# Patient Record
Sex: MALE | Race: ASIAN | NOT HISPANIC OR LATINO | Employment: STUDENT | URBAN - METROPOLITAN AREA
[De-identification: names, ages, dates, MRNs, and addresses within clinical notes are randomized per-mention and may not be internally consistent; named-entity substitution may affect disease eponyms.]

---

## 2023-04-14 ENCOUNTER — APPOINTMENT (OUTPATIENT)
Dept: LAB | Facility: LAB | Age: 28
End: 2023-04-14
Payer: COMMERCIAL

## 2023-04-14 LAB
SYPHILIS TOTAL AB: NONREACTIVE
THYROTROPIN (MIU/L) IN SER/PLAS BY DETECTION LIMIT <= 0.05 MIU/L: 0.88 MIU/L (ref 0.44–3.98)

## 2023-12-01 DIAGNOSIS — N63.10 MASS OF RIGHT BREAST, UNSPECIFIED QUADRANT: Primary | ICD-10-CM

## 2023-12-04 ENCOUNTER — OFFICE VISIT (OUTPATIENT)
Dept: SURGICAL ONCOLOGY | Facility: HOSPITAL | Age: 28
End: 2023-12-04
Payer: COMMERCIAL

## 2023-12-04 ENCOUNTER — HOSPITAL ENCOUNTER (OUTPATIENT)
Dept: RADIOLOGY | Facility: HOSPITAL | Age: 28
Discharge: HOME | End: 2023-12-04
Payer: COMMERCIAL

## 2023-12-04 VITALS
WEIGHT: 136.6 LBS | DIASTOLIC BLOOD PRESSURE: 83 MMHG | SYSTOLIC BLOOD PRESSURE: 144 MMHG | TEMPERATURE: 98.4 F | HEART RATE: 90 BPM

## 2023-12-04 DIAGNOSIS — N63.10 MASS OF RIGHT BREAST, UNSPECIFIED QUADRANT: ICD-10-CM

## 2023-12-04 DIAGNOSIS — N63.10 MASS OF RIGHT BREAST, UNSPECIFIED QUADRANT: Primary | ICD-10-CM

## 2023-12-04 DIAGNOSIS — N62 GYNECOMASTIA, MALE: ICD-10-CM

## 2023-12-04 PROCEDURE — 77066 DX MAMMO INCL CAD BI: CPT

## 2023-12-04 PROCEDURE — 76642 ULTRASOUND BREAST LIMITED: CPT | Mod: RT

## 2023-12-04 PROCEDURE — 99203 OFFICE O/P NEW LOW 30 MIN: CPT | Performed by: NURSE PRACTITIONER

## 2023-12-04 PROCEDURE — 99213 OFFICE O/P EST LOW 20 MIN: CPT | Performed by: NURSE PRACTITIONER

## 2023-12-04 ASSESSMENT — PAIN SCALES - GENERAL: PAINLEVEL: 2

## 2023-12-04 NOTE — PROGRESS NOTES
Mele Rangel male   1995 28 y.o.   [id number]    Chief Complaint  New patient, right breast mass.    History Of Present Illness  Mele Rangel is a very pleasant 28 y.o. male presenting to the breast center with a self palpated right breast mass for the past month. He denies pain, fever, chills, or nipple discharge. No breast surgery or biopsy. He has a family history of breast cancer in his paternal grandmother, 50's.    BREAST IMAGIN2023 Bilateral diagnostic mammogram with right breast ultrasound, indicates BI-RADS Category 2.    FAMILY CANCER HISTORY:   Paternal Grandmother: Breast cancer, 50's     Review of Systems  Constitutional:  Negative for appetite change, fatigue, fever and unexpected weight change.   HENT:  Negative for ear pain, hearing loss, nosebleeds, sore throat and trouble swallowing.    Eyes:  Negative for discharge, itching and visual disturbance.   Breast: As stated in HPI.  Respiratory:  Negative for cough, chest tightness and shortness of breath.    Cardiovascular:  Negative for chest pain, palpitations and leg swelling.   Gastrointestinal:  Negative for abdominal pain, constipation, diarrhea and nausea.   Endocrine: Negative for cold intolerance and heat intolerance.   Genitourinary:  Negative for dysuria, frequency, hematuria, pelvic pain and vaginal bleeding.   Musculoskeletal:  Negative for arthralgias, back pain, gait problem, joint swelling and myalgias.   Skin:  Negative for color change and rash.   Allergic/Immunologic: Negative for environmental allergies and food allergies.   Neurological:  Negative for dizziness, tremors, speech difficulty, weakness, numbness and headaches.   Hematological:  Does not bruise/bleed easily.   Psychiatric/Behavioral:  Negative for agitation, dysphoric mood and sleep disturbance. The patient is not nervous/anxious.      Past Medical History  He has no past medical history on file.    Surgical History  He has no past surgical history on  file.    Family History  Cancer-related family history is not on file.     Social History  He reports that he has never smoked. He does not have any smokeless tobacco history on file. No history on file for alcohol use and drug use.    Allergies  Patient has no known allergies.    Medications  No current outpatient medications    Last Recorded Vitals  Vitals:    12/04/23 1425   BP: 144/83   Pulse: 90   Temp: 36.9 °C (98.4 °F)        Physical Exam  Chest:       Patient is alert and oriented x3 and in a relaxed and appropriate mood. Gait is steady. Sclera is clear. The breasts are nearly symmetrical. Right breast subareolar mass consistent with gynecomastia on imaging. The left breast tissue is soft without palpable abnormalities, discrete nodules or masses. The skin and nipples appear normal. There is no cervical, supraclavicular or axillary lymphadenopathy. Heart rate and rhythm normal, S1 and S2 appreciated. The lungs are clear to auscultation bilaterally.     Relevant Results and Imaging  Study Result    Narrative & Impression   Interpreted By:  Nella Akhtar  and Jeanie Scott   STUDY:  BI US BREAST LIMITED RIGHT; BI MAMMO BILATERAL DIAGNOSTIC  TOMOSYNTHESIS;  12/4/2023 2:01 pm; 12/4/2023 2:26 pm      ACCESSION NUMBER(S):  [id number]; [id number]      ORDERING CLINICIAN:  SAHIL NELSON      INDICATION:  Right breast palpable tender lump for 1 month.      COMPARISON:  None.      FINDINGS:  ULTRASOUND:      Targeted ultrasound examination with elastography of the right breast  palpable painful area of concern in the subareolar region  demonstrates hypoechoic fibroglandular tissue without suspicious  findings.      For comparison purposes, targeted ultrasound examination with  elastography of the left breast subareolar region demonstrates  similar but smaller amount of fibroglandular tissue without  suspicious findings.      Findings suggest bilateral gynecomastia, right greater the left.       MAMMOGRAPHY: 2D and tomosynthesis images were reviewed at 1 mm slice  thickness.      Density: The breast tissue is heterogeneously dense in bilateral  subareolar region, right more than left, which may obscure small  masses. Findings are consistent with bilateral gynecomastia, moderate  in the right breast and mild in the left breast, corresponding with  the patient's symptoms and correlating with the ultrasound findings.  No suspicious masses or calcifications are identified in either  breast.      IMPRESSION:  No mammographic or targeted sonographic evidence of malignancy.  Moderate right gynecomastia corresponding to the patient's symptoms;  mild left gynecomastia. Clinical follow-up and clinical management is  recommended.      BI-RADS CATEGORY:      BI-RADS Category:  2 Benign.  Recommendation:  Clinical follow-up.  Recommended Date:  N/A.  Laterality:  Bilateral.      For any future breast imaging appointments, please call [phone]).      I personally reviewed the images/study and I agree with the findings  as stated by radiology resident Sonia Ware MD. This study was  interpreted at [place], [place].      MACRO:  None      Signed by: Nella Akhtar 12/4/2023 9:38 PM     Time was spent viewing digital images. I explained the results in depth, along with suggested explanation for follow up recommendations based on the testing results. BI-RADS Category 2    Visit Diagnosis  1. Mass of right breast, unspecified quadrant        2. Gynecomastia, male          Assessment/Plan   Right breast mass, right breast gynecomastia, no breast surgery or biopsy, family history of breast cancer    Plan:  Return to PC for annual exams.     Patient Discussion/Summary  Return to PCP for annual exams.     You can see your health information, review clinical summaries from office visits & test results online when you follow your health with MY  Chart, a personal  health record. To sign up go to [email or web address]. If you need assistance with signing up or trouble getting into your account call [facility] Patient Line 24/7 at [phone].    My office phone number is [phone] if you need to get in touch with me or have additional questions or concerns. Thank you for choosing [facility] and trusting me as your healthcare provider. I look forward to seeing you again at your next office visit. I am honored to be a provider on your health care team and I remain dedicated to helping you achieve your health goals.    Ani Iniguez, FOREST-CNP

## 2023-12-13 ENCOUNTER — APPOINTMENT (OUTPATIENT)
Dept: DERMATOLOGY | Facility: CLINIC | Age: 28
End: 2023-12-13
Payer: COMMERCIAL

## 2024-01-09 ENCOUNTER — OFFICE VISIT (OUTPATIENT)
Dept: DERMATOLOGY | Facility: CLINIC | Age: 29
End: 2024-01-09
Payer: COMMERCIAL

## 2024-01-09 DIAGNOSIS — L70.0 ACNE VULGARIS: Primary | ICD-10-CM

## 2024-01-09 DIAGNOSIS — L65.9 ALOPECIA: ICD-10-CM

## 2024-01-09 DIAGNOSIS — L63.9 ALOPECIA AREATA: ICD-10-CM

## 2024-01-09 PROCEDURE — 99214 OFFICE O/P EST MOD 30 MIN: CPT | Performed by: DERMATOLOGY

## 2024-01-09 PROCEDURE — 11901 INJECT SKIN LESIONS >7: CPT | Performed by: STUDENT IN AN ORGANIZED HEALTH CARE EDUCATION/TRAINING PROGRAM

## 2024-01-09 RX ORDER — TRETINOIN 1 MG/G
CREAM TOPICAL
COMMUNITY
Start: 2023-06-20

## 2024-01-09 RX ORDER — CLINDAMYCIN PHOSPHATE 10 UG/ML
LOTION TOPICAL
COMMUNITY

## 2024-01-09 RX ORDER — ERYTHROMYCIN AND BENZOYL PEROXIDE 30; 50 MG/G; MG/G
GEL TOPICAL NIGHTLY
Qty: 93.2 G | Refills: 11 | Status: SHIPPED | OUTPATIENT
Start: 2024-01-09 | End: 2025-01-08

## 2024-01-09 RX ORDER — CLOBETASOL PROPIONATE 0.5 MG/G
OINTMENT TOPICAL
COMMUNITY
Start: 2023-04-12

## 2024-01-09 RX ORDER — KETOCONAZOLE 20 MG/ML
SHAMPOO, SUSPENSION TOPICAL
COMMUNITY
Start: 2022-08-30 | End: 2024-04-10 | Stop reason: ALTCHOICE

## 2024-01-09 ASSESSMENT — BODY SURFACE AREA (BSA): WHAT IS THE BSA PERCENTAGE: >10% BODY SURFACE AREA INVOLVED

## 2024-01-09 ASSESSMENT — PHYSICIAN GLOBAL ASSESSMENT (PGA): WHAT IS THE PGA: PHYSICIAN GLOBAL ASSESSMENT:  3 - MODERATE

## 2024-01-09 NOTE — Clinical Note
January 9, 2024       No Recipients    Patient: Mele Rangel   YOB: 1995   Date of Visit: 1/9/2024       Dear Dr. Catalan Recipients:    Thank you for referring Mele Rangel to me for evaluation. Below are my notes for this consultation.  If you have questions, please do not hesitate to call me. I look forward to following your patient along with you.       Sincerely,     Minal Richmond MD      CC:   No Recipients  ______________________________________________________________________________________    Subjective    Mele Rangel is a 28 y.o. male who presents for the following: Acne (Follow up. Reports main concern with acne on back. Has been using his topicals as prescribed, regularly.  He reports acne on the back has stayed the same.). Patient is using tretinoin 0.1% cream nightly. Patient is using 10% Benzoyl Peroxide wash in the shower nightly, as well as 1% Clindamycin lotion on his trunk daily. Patient states that he does not feel that these have led to significant improvement. Patient has previously had 4 courses of isotretinoin, most recently in 2016. Patient is unsure of dose, but was on isotretinoin each time for 6 months. Patient may be moving at this time. If stay outside records. Topical dapsone would only need to use for just chest ti avoid systemic absorption and risk of methohemglobinemia  or BP/erythromycin leave on to leave on before bed instead of BP clindamycin, chest neck and back. Leave on at night, wear an old shirt. Ok with my chart message. Culture of a pustule. Will consider changing clindamycin using a different antibiotic if gram negative bacteria hyperpigmentation, scarring, comedones on face. Tretinoin at night only on face, the other thing at night overnight. Alopecia areata frontal scalp area of alopecia thinning of hair    Review of Systems:  No other skin or systemic complaints other than what is documented elsewhere in the note.    The following portions of the chart  were reviewed this encounter and updated as appropriate:       Specialty Problems    None    Past Medical History:  Mele Rangel  has no past medical history on file.    Past Surgical History:  Mele Rangel  has no past surgical history on file.    Family History:  Patient family history is not on file.    Social History:  Mele Rangel  reports that he has never smoked. He does not have any smokeless tobacco history on file. No history on file for alcohol use and drug use.    Allergies:  Patient has no known allergies.    Current Medications / CAM's:    Current Outpatient Medications:     clobetasol (Temovate) 0.05 % ointment, 1 Application, Disp: , Rfl:     ketoconazole (NIZOral) 2 % shampoo, 1 Application, Disp: , Rfl:     tretinoin (Retin-A) 0.1 % cream, 1 Application, Disp: , Rfl:     clindamycin (Cleocin T) 1 % lotion, apply topically to affected area twice a day ON back NECK and face, Disp: , Rfl:      Objective  Well appearing patient in no apparent distress; mood and affect are within normal limits.    A full examination *** was performed including scalp, head, eyes, ears, nose, lips, neck, chest, axillae, abdomen, back, buttocks, bilateral upper extremities, bilateral lower extremities, hands, feet, fingers, toes, fingernails, and toenails. All findings within normal limits unless otherwise noted below.         Assessment/Plan

## 2024-01-09 NOTE — PROGRESS NOTES
Subjective     Mele Rangel is a 28 y.o. male who presents for the following: Acne (Follow up. Reports main concern with acne on back. Has been using his topicals as prescribed, regularly.  He reports acne on the back has stayed the same.). Patient is using tretinoin 0.1% cream nightly. Patient is using 10% Benzoyl Peroxide wash in the shower nightly, as well as 1% Clindamycin lotion on his trunk daily. Patient states that he does not feel that these have led to significant improvement. Patient has previously had 4 courses of isotretinoin, most recently in 2016. Patient is unsure of dose, but was on isotretinoin each time for 6 months.     Review of Systems:  No other skin or systemic complaints other than what is documented elsewhere in the note.    The following portions of the chart were reviewed this encounter and updated as appropriate:       Specialty Problems    None    Past Medical History:  Mele Rangel  has no past medical history on file.    Past Surgical History:  Mele Rangel  has no past surgical history on file.    Family History:  Patient family history is not on file.    Social History:  Mele Rangel  reports that he has never smoked. He does not have any smokeless tobacco history on file. No history on file for alcohol use and drug use.    Allergies:  Patient has no known allergies.    Current Medications / CAM's:    Current Outpatient Medications:     clobetasol (Temovate) 0.05 % ointment, 1 Application, Disp: , Rfl:     ketoconazole (NIZOral) 2 % shampoo, 1 Application, Disp: , Rfl:     tretinoin (Retin-A) 0.1 % cream, 1 Application, Disp: , Rfl:     clindamycin (Cleocin T) 1 % lotion, apply topically to affected area twice a day ON back NECK and face, Disp: , Rfl:     erythromycin-benzoyl peroxide (Benzamycin) gel, Apply topically once daily at bedtime., Disp: 93.2 g, Rfl: 11     Objective   Well appearing patient in no apparent distress; mood and affect are within normal limits.    A  focused examination  as performed including scalp, head, eyes, ears, nose, lips, neck, chest, axillae, abdomen, back,  bilateral upper extremities. All findings within normal limits unless otherwise noted below.    Mid Back  Scattered comedones and inflammatory papulopustules with significant post inflammatory hyperpigmentation on the trunk.     Mid Frontal Scalp  Hair thinning and several well circumscribed patches of non scarring alopecia on central frontal scalp       Assessment/Plan   Acne vulgaris  Mid Back    Papulopustular and comedonal acne  - Currently using Benzoyl peroxide 10% wash on trunk  - Currently using Clindamycin 1% lotion on trunk  - Currently using tretinoin 0.1% cream on face.   - previous treatments have included 4 courses of outside isotretinoin in teen years (he recalls ~6 month courses, unsure cummulative dose though and notes trunk was never clear when stopped) and multiple courses of prior oral antibiotics  - limited improvement since previous examination. At this juncture, the possibility of the patient's lesions being attributable to gram negative bacterial acne or other folliculitis that would not be responsive to clindamycin must be considered. In this regard, a bacterial culture of a pustule on the patient's torso was performed.  Consideration of a new regimen of antibiotics dependent on the results of the culture. Patient is okay with the results of the culture being communicated via My Chart.  - We will stop the patient's Benzoyl Peroxide wash and Clindamycin lotion and start the patient on Benzoyl Peroxide/Erythromycin 3%-5% topical gel. Patient instructed to apply at bed time to neck, chest and back. Given that Benzoyl Peroxide can bleach, patient instructed to wear old eliseo-shirt over. May cause dryness/irritation.  - Continue tretinoin 0.1% cream to face nightly. May cause irritation/dryness. If this occurs, can decrease frequency of use.  -We discussed the potential future use  of topical dapsone but noted that this medication would need to be limited in its surface area of application to mitigate risk of systemic absorption and methemaglobinemia  - Although patient has had four courses of isotretinoin each lasting 4 months, he is unaware of total cummulative dose.  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 Should he desire in the future, records of his treatment courses would be helpful in determining total dose, as guidelines for goal dose of isotretinoin were likely different when the patient took this medication several years ago.   - additional systemic manage per culture results      Specimen A - Tissue/Wound Culture/Smear  Differential Diagnosis: Bacterial folliculitis   Check Margins Yes/No?:    Comments:    [facility] Lab: N/A    Related Procedures  Follow Up In Dermatology - Established Patient    Related Medications  erythromycin-benzoyl peroxide (Benzamycin) gel  Apply topically once daily at bedtime.    Alopecia areata    Related Procedures  Follow Up In Dermatology - Established Patient    Alopecia  Mid Frontal Scalp    Alopecia areata - chronic intermittently flaring nature reviewed of this autoimmune condition  - good prior response to intralesional kenalog. With flare since last visit he has been using his clobetasol with some early regrowth on vertex/frontal scalp, but few scattered small smooth alopecic areas along vertex and right parietal scalp to inject today.   Will proceed with intralesional kenalog injection today after risks/benefits reviewed. FUV monthly until resolved. 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   - 1mL of 5mg/mL intralesional today     Intralesional injection - Mid Frontal Scalp  Intralesional Injection:   Date/Time: 1/9/2024 3:34 PM    Consent:      Consent obtained:  Verbal    Consent given by:  Patient    Risks discussed:  Poor cosmetic result, pain, infection and bleeding    Alternatives discussed:  No treatment  Pre-Procedure Details:     Prep Type:  Isopropyl alcohol  Procedure Details:   Injection:  Triamcinolone  Post-procedure details: sterile dressing applied and wound care instructions given  Dressing type: bandage   Comments:  A total of 1cc of 5mg intralesional kenalog was injected    Related Medications  triamcinolone acetonide (Kenalog) injection 5 mg         Tao Pugh MD     I saw and evaluated the patient, participating in the key elements of the service.  I discussed the findings, assessment and plan with the resident and agree with resident’s findings and plan as documented in the resident's note.  I was immediately available for the entirety of the procedure(s) and present for the key and critical portions.     Minal Richmond MD

## 2024-01-15 ENCOUNTER — DOCUMENTATION (OUTPATIENT)
Dept: DERMATOLOGY | Facility: CLINIC | Age: 29
End: 2024-01-15
Payer: COMMERCIAL

## 2024-01-15 NOTE — PROGRESS NOTES
"RN spoke to lab on 1/12/24 and was told specimen growth started 1/10. She was told to expect the preliminary results that day. Subsequently on  1/14/24, status of the patient's culture shifted from \"in process\" to \"canceled.\" I spoke to a representative from the microbiology lab on 1/14/24 and was told that though the specimen arrived at the lab, it was subsequently misplaced, and thus ultimately canceled. No results therefore are available. The lab said that they are looking into what caused this error and that they would contact me with their findings.   "

## 2024-01-15 NOTE — PROGRESS NOTES
Spoke to Mele this evening. Explained that there was a lab error and unfortunately his culture will not result. I expressed my apologizes on behalf of . Mele picked up his new topical acne medication and will use it as we discussed. We will reassess at his next visit whether his acne is improving, and should he continue to have pustules, we will consider a repeat culture at that time.

## 2024-02-06 ENCOUNTER — OFFICE VISIT (OUTPATIENT)
Dept: DERMATOLOGY | Facility: CLINIC | Age: 29
End: 2024-02-06
Payer: COMMERCIAL

## 2024-02-06 DIAGNOSIS — L63.9 ALOPECIA AREATA: ICD-10-CM

## 2024-02-06 DIAGNOSIS — L70.0 ACNE VULGARIS: ICD-10-CM

## 2024-02-06 PROCEDURE — 99213 OFFICE O/P EST LOW 20 MIN: CPT | Performed by: DERMATOLOGY

## 2024-02-06 PROCEDURE — 11900 INJECT SKIN LESIONS </W 7: CPT | Performed by: DERMATOLOGY

## 2024-02-06 RX ORDER — PROPRANOLOL HYDROCHLORIDE 60 MG/1
60 CAPSULE, EXTENDED RELEASE ORAL 2 TIMES DAILY
COMMUNITY
Start: 2024-02-02 | End: 2024-03-03

## 2024-02-06 RX ORDER — METHIMAZOLE 10 MG/1
20 TABLET ORAL 2 TIMES DAILY
COMMUNITY
Start: 2024-02-02 | End: 2024-03-03

## 2024-02-06 ASSESSMENT — DERMATOLOGY QUALITY OF LIFE (QOL) ASSESSMENT
DATE THE QUALITY-OF-LIFE ASSESSMENT WAS COMPLETED: 66876
WHAT SINGLE SKIN CONDITION LISTED BELOW IS THE PATIENT ANSWERING THE QUALITY-OF-LIFE ASSESSMENT QUESTIONS ABOUT: NONE OF THE ABOVE

## 2024-02-06 ASSESSMENT — ITCH NUMERIC RATING SCALE: HOW SEVERE IS YOUR ITCHING?: 0

## 2024-02-06 NOTE — PROGRESS NOTES
Subjective     Mele Rangel is a 28 y.o. male who presents for the following: Alopecia and Acne. Patient last seen 1/9/2023. Patient states that he was hospitalized 2/31 for Graves disease. Patient was started on methimazole and propanolol.    Patient is currently using clindamycin 1% lotion in the mornings and Benzoyl Peroxide Erythromycin 3%-5% in the evenings. He notes improvement with the Benzoyl Peroxide Erythromycin which was started at last visit in the acne on his back. Has not been putting the medication in the fridge. He continues to use tretinoin 0.1% cream nightly on the acne on his face with improvement. Patient states that he has noticed improvement in the areas of alopecia that were injected at last visit but has noticed generalized hair thinning.     Review of Systems:  No other skin or systemic complaints other than what is documented elsewhere in the note.    The following portions of the chart were reviewed this encounter and updated as appropriate:       Specialty Problems    None    Past Medical History:  Mele Rangel  has no past medical history on file.    Past Surgical History:  Mele Rangel  has no past surgical history on file.    Family History:  Patient family history is not on file.    Social History:  Mele Rangel  reports that he has never smoked. He does not have any smokeless tobacco history on file. No history on file for alcohol use and drug use.    Allergies:  Patient has no known allergies.    Current Medications / CAM's:    Current Outpatient Medications:     methIMAzole (Tapazole) 10 mg tablet, Take 2 tablets (20 mg) by mouth twice a day., Disp: , Rfl:     propranolol LA (Inderal LA) 60 mg 24 hr capsule, Take 1 capsule (60 mg) by mouth twice a day., Disp: , Rfl:     clindamycin (Cleocin T) 1 % lotion, apply topically to affected area twice a day ON back NECK and face, Disp: , Rfl:     clobetasol (Temovate) 0.05 % ointment, 1 Application, Disp: , Rfl:      erythromycin-benzoyl peroxide (Benzamycin) gel, Apply topically once daily at bedtime., Disp: 93.2 g, Rfl: 11    ketoconazole (NIZOral) 2 % shampoo, 1 Application, Disp: , Rfl:     tretinoin (Retin-A) 0.1 % cream, 1 Application, Disp: , Rfl:      Objective   Well appearing patient in no apparent distress; mood and affect are within normal limits.    A focused examination was performed including scalp, head, eyes, ears, nose, lips, neck, chest, axillae, abdomen, back. All findings within normal limits unless otherwise noted below.    Head - Anterior (Face)  Scattered comedones and inflammatory papulopustules with significant post inflammatory hyperpigmentation on the trunk. Noticeable decrease in active lesion on trunk since last exam.    Scalp  Generalized hair thinning in an approx 6x6 cm patch on the right parietal scalp and several very small well circumscribed patches of non scarring alopecia on central/right vertex scalp          Assessment/Plan   Acne vulgaris  Head - Anterior (Face)    Papulopustular and comedonal acne with improvement since last visit of the acne on his trunk. Mostly postinflammatory hyperpigmentation with only a few scattered papules and pustules on trunk >>> face/chest today    - Currently using Benzoyl Peroxide/Erythromycin 3%-5% topical gel.  - Currently using Clindamycin 1% lotion on trunk  - Currently using tretinoin 0.1% cream on face.     - previous treatments have included 4 courses of outside isotretinoin in teen years (he recalls ~6 month courses, unsure cummulative dose though and notes trunk was never clear when stopped) and multiple courses of prior oral antibiotics. Can consider additional course of prolonged/high cummulative dose isotretinoin in future.  Did try to culture at last visit from trunk to see if gram neg acne, but unfortunately the micro-lab lost his specimen and culture was not completed.     -Continue using Benzoyl Peroxide/Erythromycin 3%-5% topical gel.  Patient instructed to apply at bed time to neck, chest and back. Keep medication in refrigerator. Given that Benzoyl Peroxide can bleach, patient instructed to wear old eliseo-shirt over. May cause dryness/irritation.    - Continue tretinoin 0.1% cream to face nightly. May cause irritation/dryness. If this occurs, can decrease frequency of use.    - Although patient has had four courses of isotretinoin each lasting 4 months, he is unaware of total cummulative dose.  The patient defers consideration of restarting isotretinoin at this time as he isn't sure if he will need to move this summer with the upcoming residency match. Should he desire in the future, records of his treatment courses would be helpful in determining total dose, as guidelines for goal dose of isotretinoin were likely different when the patient took this medication several years ago.       Related Procedures  Follow Up In Dermatology - Established Patient    Related Medications  erythromycin-benzoyl peroxide (Benzamycin) gel  Apply topically once daily at bedtime.    Alopecia areata  Scalp    Alopecia areata- slight improvement since last visit, but still with increased shedding. Repeat ILK today. Continue home potent topical steroid. His new Dx of grave's disease may also  be factoring in on the shedding, hopefully will notice improvement with therapy for that.     triamcinolone acetonide (Kenalog) injection 10 mg - Scalp      Intralesional injection - Scalp  Intralesional Injection:   Consent:     Consent obtained:  Verbal    Consent given by:  Patient    Risks discussed:  Poor cosmetic result, pain, infection and bleeding    Alternatives discussed:  No treatment  Pre-Procedure Details:     Prep Type:  Isopropyl alcohol  Procedure Details:   Injection:  Triamcinolone  Outcome: patient tolerated procedure well  Post-procedure details: sterile dressing applied and wound care instructions given  Dressing type: bandage   Comments:  A total of 1 ml of 10  mg/mL Kenalog was injected.  Lot [id number], Expiration 12/25.    Related Procedures  Follow Up In Dermatology - Established Patient  Follow Up In Dermatology - Established Patient       Tao Pugh MD    I saw and evaluated the patient, participating in the key elements of the service.  I discussed the findings, assessment and plan with the resident and agree with resident’s findings and plan as documented in the resident's note.  I was present for the entirety of the procedure(s).     Minal Richmond MD

## 2024-02-28 ENCOUNTER — OFFICE VISIT (OUTPATIENT)
Dept: DERMATOLOGY | Facility: CLINIC | Age: 29
End: 2024-02-28
Payer: COMMERCIAL

## 2024-02-28 DIAGNOSIS — L63.9 ALOPECIA AREATA: ICD-10-CM

## 2024-02-28 PROCEDURE — 11900 INJECT SKIN LESIONS </W 7: CPT

## 2024-02-28 ASSESSMENT — DERMATOLOGY QUALITY OF LIFE (QOL) ASSESSMENT
RATE HOW EMOTIONALLY BOTHERED YOU ARE BY YOUR SKIN PROBLEM (FOR EXAMPLE, WORRY, EMBARRASSMENT, FRUSTRATION): 0 - NEVER BOTHERED
ARE THERE EXCLUSIONS OR EXCEPTIONS FOR THE QUALITY OF LIFE ASSESSMENT: NO
DATE THE QUALITY-OF-LIFE ASSESSMENT WAS COMPLETED: 66898
RATE HOW BOTHERED YOU ARE BY EFFECTS OF YOUR SKIN PROBLEMS ON YOUR ACTIVITIES (EG, GOING OUT, ACCOMPLISHING WHAT YOU WANT, WORK ACTIVITIES OR YOUR RELATIONSHIPS WITH OTHERS): 0 - NEVER BOTHERED
RATE HOW BOTHERED YOU ARE BY SYMPTOMS OF YOUR SKIN PROBLEM (EG, ITCHING, STINGING BURNING, HURTING OR SKIN IRRITATION): 0 - NEVER BOTHERED

## 2024-02-28 ASSESSMENT — DERMATOLOGY PATIENT ASSESSMENT: DO YOU HAVE ANY NEW OR CHANGING LESIONS: NO

## 2024-02-28 ASSESSMENT — PATIENT GLOBAL ASSESSMENT (PGA): PATIENT GLOBAL ASSESSMENT: PATIENT GLOBAL ASSESSMENT:  1 - CLEAR

## 2024-02-28 ASSESSMENT — ITCH NUMERIC RATING SCALE: HOW SEVERE IS YOUR ITCHING?: 0

## 2024-02-28 NOTE — PROGRESS NOTES
Subjective     Mele Rangel is a 29 y.o. male who presents for the following: Alopecia (He reports hair seems to be filling in.  ). Patient states he has been applying clobetasol 0.05% ointment nightly to the patch in the scalp, and has been applying minoxidil 5% intermittently to the area. He has noticed some regrowth in the area. He is also getting his Graves' more under control with methimazole and propanolol, and is following closely with endocrinology.     He also has acne on the back which he is using erythromycin/benzoyl peroxide, and applying clindamycin 1% lotion and tretinoin 0.1% cream to the face. He has declined initiating another round of isotretinoin at this time.     Skin Cancer History  No skin cancer on file.    Review of Systems:  No other skin or systemic complaints other than what is documented elsewhere in the note.    The following portions of the chart were reviewed this encounter and updated as appropriate:       Specialty Problems    None    Past Medical History:  Mele Rangel  has no past medical history on file.    Past Surgical History:  Mele Rangel  has no past surgical history on file.    Family History:  Patient family history is not on file.    Social History:  Mele Rangel  reports that he has never smoked. He does not have any smokeless tobacco history on file. No history on file for alcohol use and drug use.    Allergies:  Patient has no known allergies.    Current Medications / CAM's:    Current Outpatient Medications:     clindamycin (Cleocin T) 1 % lotion, apply topically to affected area twice a day ON back NECK and face, Disp: , Rfl:     clobetasol (Temovate) 0.05 % ointment, 1 Application, Disp: , Rfl:     erythromycin-benzoyl peroxide (Benzamycin) gel, Apply topically once daily at bedtime., Disp: 93.2 g, Rfl: 11    ketoconazole (NIZOral) 2 % shampoo, 1 Application, Disp: , Rfl:     methIMAzole (Tapazole) 10 mg tablet, Take 2 tablets (20 mg) by mouth twice a day.,  Disp: , Rfl:     propranolol LA (Inderal LA) 60 mg 24 hr capsule, Take 1 capsule (60 mg) by mouth twice a day., Disp: , Rfl:     tretinoin (Retin-A) 0.1 % cream, 1 Application, Disp: , Rfl:   No current facility-administered medications for this visit.     Objective   Well appearing patient in no apparent distress; mood and affect are within normal limits.    A partial examination  was performed including scalp, head, eyes, ears, nose, lips, neck, chest, back. All findings within normal limits unless otherwise noted below.    Scalp  Thinning of the frontal scalp with alopecic patch of the right frontoparietal scalp with areas of regrowth        Assessment/Plan   Alopecia areata  Scalp    Alopecia Areata, improving   -Regrowth is noted in the area today. Photo taken to continue to monitor.   - Grave's disease is improving with med management with endo  -Discussed option of continuing ILK today, patient agreed.   -Instructed patient to continue clobetasol 0.05% ointment to the affected areas of the scalp nightly. Discussed risk of atrophy/telangiectasias with topical and intralesional steroid use.   -1cc of ILK5 injected to alopecic patch as well as thinned area of frontal scalp today.   -Follow up in one month for repeat ILK     Related Procedures  Follow Up In Dermatology - Established Patient  Follow Up In Dermatology    Related Medications  triamcinolone acetonide (Kenalog) injection 5 mg        Leesa Walker DO   Dermatology Resident, PGY-2

## 2024-04-10 ENCOUNTER — OFFICE VISIT (OUTPATIENT)
Dept: DERMATOLOGY | Facility: CLINIC | Age: 29
End: 2024-04-10
Payer: COMMERCIAL

## 2024-04-10 DIAGNOSIS — L21.9 SEBORRHEIC DERMATITIS: ICD-10-CM

## 2024-04-10 DIAGNOSIS — L70.0 ACNE VULGARIS: Primary | ICD-10-CM

## 2024-04-10 DIAGNOSIS — L63.9 ALOPECIA AREATA: ICD-10-CM

## 2024-04-10 PROCEDURE — 99214 OFFICE O/P EST MOD 30 MIN: CPT | Performed by: DERMATOLOGY

## 2024-04-10 RX ORDER — KETOCONAZOLE 20 MG/G
CREAM TOPICAL
Qty: 30 G | Refills: 11 | Status: SHIPPED | OUTPATIENT
Start: 2024-04-10

## 2024-04-10 ASSESSMENT — PATIENT GLOBAL ASSESSMENT (PGA): PATIENT GLOBAL ASSESSMENT: PATIENT GLOBAL ASSESSMENT:  1 - CLEAR

## 2024-04-10 ASSESSMENT — DERMATOLOGY QUALITY OF LIFE (QOL) ASSESSMENT
RATE HOW BOTHERED YOU ARE BY EFFECTS OF YOUR SKIN PROBLEMS ON YOUR ACTIVITIES (EG, GOING OUT, ACCOMPLISHING WHAT YOU WANT, WORK ACTIVITIES OR YOUR RELATIONSHIPS WITH OTHERS): 0 - NEVER BOTHERED
WHAT SINGLE SKIN CONDITION LISTED BELOW IS THE PATIENT ANSWERING THE QUALITY-OF-LIFE ASSESSMENT QUESTIONS ABOUT: ACNE
DATE THE QUALITY-OF-LIFE ASSESSMENT WAS COMPLETED: 66940
RATE HOW EMOTIONALLY BOTHERED YOU ARE BY YOUR SKIN PROBLEM (FOR EXAMPLE, WORRY, EMBARRASSMENT, FRUSTRATION): 0 - NEVER BOTHERED
RATE HOW BOTHERED YOU ARE BY SYMPTOMS OF YOUR SKIN PROBLEM (EG, ITCHING, STINGING BURNING, HURTING OR SKIN IRRITATION): 0 - NEVER BOTHERED

## 2024-04-10 NOTE — PROGRESS NOTES
Subjective     Mele Rangel is a 29 y.o. male who presents for the following: Alopecia (Reports improvement, does not feel patches of hair loss.) and Acne (Follow up, using and tolerating topicals well per Mele.).     Skin Cancer History  No skin cancer on file.      Review of Systems:  No other skin or systemic complaints other than what is documented elsewhere in the note.    The following portions of the chart were reviewed this encounter and updated as appropriate:       Specialty Problems    None    Past Medical History:  Mele Rangel  has no past medical history on file.    Past Surgical History:  Mele Rangel  has no past surgical history on file.    Family History:  Patient family history is not on file.    Social History:  Mele Rangel  reports that he has never smoked. He does not have any smokeless tobacco history on file. No history on file for alcohol use and drug use.    Allergies:  Patient has no known allergies.    Current Medications / CAM's:    Current Outpatient Medications:     clindamycin (Cleocin T) 1 % lotion, apply topically to affected area twice a day ON back NECK and face, Disp: , Rfl:     clobetasol (Temovate) 0.05 % ointment, 1 Application, Disp: , Rfl:     erythromycin-benzoyl peroxide (Benzamycin) gel, Apply topically once daily at bedtime., Disp: 93.2 g, Rfl: 11    ketoconazole (NIZOral) 2 % cream, Apply twice daily to affected areas on face as needed, Disp: 30 g, Rfl: 11    methIMAzole (Tapazole) 10 mg tablet, Take 2 tablets (20 mg) by mouth twice a day., Disp: , Rfl:     propranolol LA (Inderal LA) 60 mg 24 hr capsule, Take 1 capsule (60 mg) by mouth twice a day., Disp: , Rfl:     tretinoin (Retin-A) 0.1 % cream, 1 Application, Disp: , Rfl:      Objective   Well appearing patient in no apparent distress; mood and affect are within normal limits.    A focused exam:     Chest - Medial (Center), Left Breast, Left Lower Back, Left Upper Back, Mid Back, Neck - Anterior, Neck -  Posterior, Right Breast, Right Lower Back, Right Upper Back  Scattered comedones and inflammatory papulopustules with significant postinflammatory hyperpigmentation and scarring on trunk, neck >>>> face    Mid Frontal Scalp  All areas resolved with nice regrowth    Head - Anterior (Face)  Mild scale eyebrows/beard distribution         Assessment/Plan   Acne vulgaris  Neck - Anterior; Neck - Posterior; Chest - Medial (Center); Left Breast; Right Breast; Left Upper Back; Right Upper Back; Left Lower Back; Right Lower Back; Mid Back    Acne- moderate to severe papulopustular with scarring on trunk/neck >> face  - Prior isotretinoin courses without clearance  - Prior systemic and topical antibiotics and keratolytics without any sustained benefit.   - Will consider a repeat course of likely prolonged isotretinoin- 140lb weight; No depression, IBD, liver issues, migraines.   - order for ALT/TG at baseline today (will likely get in June or July) and plan to repeat once at goal dose, additional labwork prn course.   - reviewed ipledge site/REMS, he will review online patient info.  -Reviewed side effects of the medication including (but not limited to) xerosis, dry eyes and mouth, elevated liver enzymes, elevated triglyceride levels, alopecia, joint and muscle pain, changes in mood, headaches, etc.  -Will need to continue with therapy until goal cumulative dose of likely > 220mg/kg and/or until 2 months without new acne breakouts.     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 Will send me a my chart when gets his baseline labs/wants to start first month of 0.5mg/kg dose with plan to arrange monthly FUV based on start date. Until then, continue the benzamycin gel and tretinoin 0.1% cream     Related Procedures  Triglycerides  Alanine Aminotransferase    Related Medications  erythromycin-benzoyl peroxide (Benzamycin)  gel  Apply topically once daily at bedtime.    Alopecia areata  Mid Frontal Scalp    Clear today, no injections, return PRN     Related Procedures  Follow Up In Dermatology    Seborrheic dermatitis  Head - Anterior (Face)    Seborrhea- mild  - well controlled with prn keto cream, refill sent  - using selenium sulfide otc as face wash as well  - discontinue keto shampoo    ketoconazole (NIZOral) 2 % cream - Head - Anterior (Face)  Apply twice daily to affected areas on face as needed       Will send mychart when ready to start isotret/has residency sched    Minal Richmond MD

## 2024-05-08 ENCOUNTER — APPOINTMENT (OUTPATIENT)
Dept: DERMATOLOGY | Facility: CLINIC | Age: 29
End: 2024-05-08
Payer: COMMERCIAL

## 2024-05-20 ENCOUNTER — OFFICE VISIT (OUTPATIENT)
Dept: OPHTHALMOLOGY | Facility: CLINIC | Age: 29
End: 2024-05-20
Payer: COMMERCIAL

## 2024-05-20 DIAGNOSIS — Z01.00 EXAMINATION OF EYES AND VISION: Primary | ICD-10-CM

## 2024-05-20 PROCEDURE — LSREX EMPLOYEE LASER EYE EXAM: Performed by: OPTOMETRIST

## 2024-05-20 ASSESSMENT — CONF VISUAL FIELD
OS_INFERIOR_TEMPORAL_RESTRICTION: 0
OS_SUPERIOR_NASAL_RESTRICTION: 0
OD_SUPERIOR_TEMPORAL_RESTRICTION: 0
OD_SUPERIOR_NASAL_RESTRICTION: 0
OD_NORMAL: 1
OS_NORMAL: 1
OD_INFERIOR_NASAL_RESTRICTION: 0
OS_SUPERIOR_TEMPORAL_RESTRICTION: 0
METHOD: COUNTING FINGERS
OS_INFERIOR_NASAL_RESTRICTION: 0
OD_INFERIOR_TEMPORAL_RESTRICTION: 0

## 2024-05-20 ASSESSMENT — ENCOUNTER SYMPTOMS
PSYCHIATRIC NEGATIVE: 0
EYES NEGATIVE: 0
HEMATOLOGIC/LYMPHATIC NEGATIVE: 0
GASTROINTESTINAL NEGATIVE: 0
ENDOCRINE NEGATIVE: 0
CONSTITUTIONAL NEGATIVE: 0
RESPIRATORY NEGATIVE: 0
MUSCULOSKELETAL NEGATIVE: 0
NEUROLOGICAL NEGATIVE: 0
ALLERGIC/IMMUNOLOGIC NEGATIVE: 0
CARDIOVASCULAR NEGATIVE: 0

## 2024-05-20 ASSESSMENT — SLIT LAMP EXAM - LIDS
COMMENTS: NORMAL
COMMENTS: NORMAL

## 2024-05-20 ASSESSMENT — VISUAL ACUITY
OS_CC: 20/25
OS_CC+: +2
CORRECTION_TYPE: GLASSES
OD_CC: 20/20
METHOD: SNELLEN - LINEAR

## 2024-05-20 ASSESSMENT — EXTERNAL EXAM - LEFT EYE: OS_EXAM: NORMAL

## 2024-05-20 ASSESSMENT — EXTERNAL EXAM - RIGHT EYE: OD_EXAM: NORMAL

## 2024-05-20 ASSESSMENT — TONOMETRY
IOP_METHOD: GOLDMANN APPLANATION
OD_IOP_MMHG: 16
OS_IOP_MMHG: 16

## 2024-05-20 ASSESSMENT — CUP TO DISC RATIO
OD_RATIO: 0.2
OS_RATIO: 0.2

## 2024-06-26 ENCOUNTER — LAB (OUTPATIENT)
Dept: LAB | Facility: LAB | Age: 29
End: 2024-06-26
Payer: COMMERCIAL

## 2024-06-26 DIAGNOSIS — L70.0 ACNE VULGARIS: ICD-10-CM

## 2024-06-26 LAB
ALT SERPL W P-5'-P-CCNC: 93 U/L (ref 10–52)
TRIGL SERPL-MCNC: 113 MG/DL (ref 0–149)

## 2024-06-26 PROCEDURE — 84478 ASSAY OF TRIGLYCERIDES: CPT

## 2024-06-26 PROCEDURE — 84460 ALANINE AMINO (ALT) (SGPT): CPT

## 2024-06-30 DIAGNOSIS — Z92.29 HISTORY OF ISOTRETINOIN THERAPY: Primary | ICD-10-CM

## 2024-10-06 NOTE — PROGRESS NOTES
Endocrinology  10/7/2024    History of Present Illness   Mele Rangel is a 29 y.o. year old male with medical history of Graves' disease s/p HERNANDEZ (6/2024), here to establish care.     He is an ENT intern here at .     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 He was hospitalized at [facility] for 4 days for thyrotoxicosis.    Had HERNANDEZ 6/2024. After HERNANDEZ he did feel symptomatic with hyperthyroid symptoms again.   Over the last month, he has not noticed any hyperthyroid symptoms.   Has not used any BB in about 1.5 months.     Weight changes: Weight up 15 lbs. - he has been trying to put on weight.   Heat or cold intolerance: Denies    Palpitations: Denies    Tremor: Denies    Bowel changes: Denies    Difficulty sleeping: Denies      Eye changes: Denies   Neck pain: Denies    Difficulty breathing: Denies    Difficulty swallowing: Denies    Change in voice: Denies      Family history of thyroid disease: Denies    History of radiation to the head/neck/chest: Denies    Amiodarone use: No    Biotin use: No      Medications     Current Outpatient Medications   Medication Instructions    clindamycin (Cleocin T) 1 % lotion apply topically to affected area twice a day ON back NECK and face    clobetasol (Temovate) 0.05 % ointment 1 Application    erythromycin-benzoyl peroxide (Benzamycin) gel Topical, Nightly    ketoconazole (NIZOral) 2 % cream Apply twice daily to affected areas on face as needed    methIMAzole (TAPAZOLE) 20 mg, oral, 2 times daily    propranolol LA (INDERAL LA) 60 mg, oral, 2 times daily    tretinoin (Retin-A) 0.1 % cream 1 Application          History    No past medical history on file.    No past surgical history on file.    Family History   Problem Relation Name Age of Onset    Strabismus Sister          No Known Allergies     Social history:   - ENT intern        Physical Exam   /85 (BP Location: Right arm, Patient Position: Sitting)   Pulse 80    "Temp 36.1 °C (97 °F)   Resp 18   Ht 1.676 m (5' 6\")   Wt 72.6 kg (160 lb)   BMI 25.82 kg/m²   General: Well appearing, no acute distress  Heart: Normal rate  Neck: Soft, nontender, no lymphadenopathy, thyroid normal in size   Lungs: Breathing comfortably on room air   Extremities: Warm, no edema, no tremor   Skin: No rashes      Labs and Imaging     Lab Results   Component Value Date    TSH 0.88 04/14/2023 7/2024  Component  Ref Range & Units 2 mo ago   TSH  0.270 - 4.200 mIU/L <0.005 Low      Component  Ref Range & Units 2 mo ago   Free T4  0.9 - 1.7 ng/dL 3.2 High      Free T3  2.3 - 4.1 pg/mL 8.0 High        Assessment and Plan   Mele Rangel is a 29 y.o. year old male with medical history of Graves' disease s/p HERNANDEZ (6/2024), here to establish care. Clinically euthyroid today. Discussed monitoring for hypothyroidism, every 4-6 weeks for 6 months after HERNANDEZ. If he remains euthyroid 6 months after HERNANDEZ, can space out TFTs. Discussed possible need for levothyroxine, how to take it, and monitoring after initiation.     # Graves' disease s/p HERNANDEZ 6/2024:  - TSH an FT4 now   - If euthyroid, plan to check TSH and FT4 every 4-6 weeks through 12/2024    RTC: 3 months      Zara Mays, DO   Endocrinology                    "

## 2024-10-07 ENCOUNTER — LAB (OUTPATIENT)
Dept: LAB | Facility: LAB | Age: 29
End: 2024-10-07
Payer: COMMERCIAL

## 2024-10-07 ENCOUNTER — APPOINTMENT (OUTPATIENT)
Dept: ENDOCRINOLOGY | Facility: CLINIC | Age: 29
End: 2024-10-07
Payer: COMMERCIAL

## 2024-10-07 VITALS
TEMPERATURE: 97 F | WEIGHT: 160 LBS | BODY MASS INDEX: 25.71 KG/M2 | DIASTOLIC BLOOD PRESSURE: 85 MMHG | SYSTOLIC BLOOD PRESSURE: 130 MMHG | HEIGHT: 66 IN | RESPIRATION RATE: 18 BRPM | HEART RATE: 80 BPM

## 2024-10-07 DIAGNOSIS — E05.00 GRAVES DISEASE: ICD-10-CM

## 2024-10-07 DIAGNOSIS — E05.00 GRAVES DISEASE: Primary | ICD-10-CM

## 2024-10-07 LAB
T4 FREE SERPL-MCNC: 0.36 NG/DL (ref 0.78–1.48)
TSH SERPL-ACNC: 106.01 MIU/L (ref 0.44–3.98)

## 2024-10-07 PROCEDURE — 3008F BODY MASS INDEX DOCD: CPT | Performed by: STUDENT IN AN ORGANIZED HEALTH CARE EDUCATION/TRAINING PROGRAM

## 2024-10-07 PROCEDURE — 36415 COLL VENOUS BLD VENIPUNCTURE: CPT

## 2024-10-07 PROCEDURE — 84439 ASSAY OF FREE THYROXINE: CPT

## 2024-10-07 PROCEDURE — 84443 ASSAY THYROID STIM HORMONE: CPT

## 2024-10-07 PROCEDURE — 99204 OFFICE O/P NEW MOD 45 MIN: CPT | Performed by: STUDENT IN AN ORGANIZED HEALTH CARE EDUCATION/TRAINING PROGRAM

## 2024-10-07 ASSESSMENT — PATIENT HEALTH QUESTIONNAIRE - PHQ9
2. FEELING DOWN, DEPRESSED OR HOPELESS: NOT AT ALL
SUM OF ALL RESPONSES TO PHQ9 QUESTIONS 1 AND 2: 0
1. LITTLE INTEREST OR PLEASURE IN DOING THINGS: NOT AT ALL

## 2024-10-08 ENCOUNTER — TELEPHONE (OUTPATIENT)
Dept: ENDOCRINOLOGY | Facility: CLINIC | Age: 29
End: 2024-10-08
Payer: COMMERCIAL

## 2024-10-08 DIAGNOSIS — L70.0 ACNE VULGARIS: ICD-10-CM

## 2024-10-08 DIAGNOSIS — E89.0 POSTABLATIVE HYPOTHYROIDISM: ICD-10-CM

## 2024-10-08 DIAGNOSIS — E05.00 GRAVES DISEASE: Primary | ICD-10-CM

## 2024-10-08 PROCEDURE — RXMED WILLOW AMBULATORY MEDICATION CHARGE

## 2024-10-08 RX ORDER — LEVOTHYROXINE SODIUM 112 UG/1
112 TABLET ORAL DAILY
Qty: 30 TABLET | Refills: 11 | Status: SHIPPED | OUTPATIENT
Start: 2024-10-08 | End: 2025-10-08

## 2024-10-08 RX ORDER — ERYTHROMYCIN AND BENZOYL PEROXIDE 30; 50 MG/G; MG/G
GEL TOPICAL NIGHTLY
Qty: 93.2 G | Refills: 3 | Status: SHIPPED | OUTPATIENT
Start: 2024-10-08 | End: 2025-10-08

## 2024-10-08 NOTE — TELEPHONE ENCOUNTER
Spoke to patient. Now hypothyroid.   Will start levothyroxine 112 mcg daily.   Repeat TSH in 8 weeks.

## 2024-10-09 ENCOUNTER — PHARMACY VISIT (OUTPATIENT)
Dept: PHARMACY | Facility: CLINIC | Age: 29
End: 2024-10-09
Payer: COMMERCIAL

## 2024-11-05 PROCEDURE — RXMED WILLOW AMBULATORY MEDICATION CHARGE

## 2024-11-07 ENCOUNTER — PHARMACY VISIT (OUTPATIENT)
Dept: PHARMACY | Facility: CLINIC | Age: 29
End: 2024-11-07
Payer: COMMERCIAL

## 2024-11-23 ENCOUNTER — LAB (OUTPATIENT)
Dept: LAB | Facility: LAB | Age: 29
End: 2024-11-23
Payer: COMMERCIAL

## 2024-11-23 DIAGNOSIS — E89.0 POSTABLATIVE HYPOTHYROIDISM: ICD-10-CM

## 2024-11-23 LAB — TSH SERPL-ACNC: 9.03 MIU/L (ref 0.44–3.98)

## 2024-11-23 PROCEDURE — 84443 ASSAY THYROID STIM HORMONE: CPT

## 2024-11-23 PROCEDURE — 36415 COLL VENOUS BLD VENIPUNCTURE: CPT

## 2024-11-25 DIAGNOSIS — E89.0 POSTABLATIVE HYPOTHYROIDISM: Primary | ICD-10-CM

## 2024-11-25 PROCEDURE — RXMED WILLOW AMBULATORY MEDICATION CHARGE

## 2024-11-25 RX ORDER — LEVOTHYROXINE SODIUM 125 UG/1
125 TABLET ORAL DAILY
Qty: 30 TABLET | Refills: 11 | Status: SHIPPED | OUTPATIENT
Start: 2024-11-25 | End: 2025-11-25

## 2024-12-02 ENCOUNTER — PHARMACY VISIT (OUTPATIENT)
Dept: PHARMACY | Facility: CLINIC | Age: 29
End: 2024-12-02
Payer: COMMERCIAL

## 2024-12-12 ENCOUNTER — APPOINTMENT (OUTPATIENT)
Dept: ENDOCRINOLOGY | Facility: CLINIC | Age: 29
End: 2024-12-12
Payer: COMMERCIAL

## 2024-12-27 ENCOUNTER — PHARMACY VISIT (OUTPATIENT)
Dept: PHARMACY | Facility: CLINIC | Age: 29
End: 2024-12-27
Payer: COMMERCIAL

## 2024-12-27 PROCEDURE — RXMED WILLOW AMBULATORY MEDICATION CHARGE

## 2025-01-04 ENCOUNTER — LAB (OUTPATIENT)
Dept: LAB | Facility: LAB | Age: 30
End: 2025-01-04
Payer: COMMERCIAL

## 2025-01-04 DIAGNOSIS — Z92.29 HISTORY OF ISOTRETINOIN THERAPY: ICD-10-CM

## 2025-01-04 DIAGNOSIS — E89.0 POSTABLATIVE HYPOTHYROIDISM: ICD-10-CM

## 2025-01-04 LAB
ALT SERPL W P-5'-P-CCNC: 31 U/L (ref 10–52)
TSH SERPL-ACNC: 0.52 MIU/L (ref 0.44–3.98)

## 2025-01-04 PROCEDURE — 84443 ASSAY THYROID STIM HORMONE: CPT

## 2025-01-06 NOTE — PROGRESS NOTES
Endocrinology  1/7/2025    An interactive audio and video telecommunication system which permits real time communications between the patient (at the originating site) and provider (at the distant site) was utilized to provide this telehealth service.    Verbal consent was requested and obtained from Mele Rangel on 01/07/25 1:13 PM for a telehealth visit.     History of Present Illness   Mele Rangel is a 29 y.o. year old male with medical history of Graves' disease s/p HERNANDEZ (6/2024) now with post-ablative hypothyroidism, here for follow up.     LV: 10/7/2024  Since LV, labs showed hypothyroidism.   Started on LT4 and dose adjusted.     Feeling great overall.   Sometimes fatigued when exercising.   He was experiencing this when he was very hyperthyroid as well.     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 He was hospitalized at [facility] for 4 days for thyrotoxicosis.  Had HERNANDEZ 6/2024    Weight changes: Stable   Heat or cold intolerance: Denies    Palpitations: Denies    Tremor: Denies    Bowel changes: Denies    Difficulty sleeping: Denies      Eye changes: Denies   Neck pain: Denies    Difficulty breathing: Denies    Difficulty swallowing: Denies    Change in voice: Denies      Family history of thyroid disease: Denies    History of radiation to the head/neck/chest: Denies    Amiodarone use: No    Biotin use: No      Medications     Current Outpatient Medications   Medication Instructions    clindamycin (Cleocin T) 1 % lotion apply topically to affected area twice a day ON back NECK and face    clobetasol (Temovate) 0.05 % ointment 1 Application    erythromycin-benzoyl peroxide (Benzamycin) gel Topical, Nightly    ketoconazole (NIZOral) 2 % cream Apply twice daily to affected areas on face as  needed    levothyroxine (SYNTHROID, LEVOXYL) 125 mcg, oral, Daily, Take on an empty stomach at the same time each day, either 30 to 60 minutes prior to breakfast    tretinoin (Retin-A) 0.1 % cream 1 Application          History    No past medical history on file.    No past surgical history on file.    Family History   Problem Relation Name Age of Onset    Strabismus Sister          No Known Allergies     Social history:   - ENT intern        Physical Exam   There were no vitals taken for this visit.  General: Well appearing, no acute distress      Labs and Imaging     Lab Results   Component Value Date    TSH 0.52 01/04/2025    FREET4 0.36 (L) 10/07/2024     Assessment and Plan   Mele Rangel is a 29 y.o. year old male with medical history of Graves' disease s/p HERNANDEZ (6/2024) now with post-ablative hypothyroidism, here for follow up. Since LV, he was found to be hypothyroid and started on levothyroxine. Dose was adjusted, but most recent labs were only ~4 weeks since adjustment. Given how close he is to [facility], will repeat TSH in 4 weeks again to make sure he is not being over treated. Clinically, he is feeling very well.     # Graves' disease s/p HERNANDEZ 6/2024 now with post-ablative hypothyroidism:  - TSH in 4 weeks 2/4/2025   - Continue levothyroxine 125 mcg daily - dose adjustment pending labs     RTC: 1 year     Zara Mays DO   Endocrinology

## 2025-01-07 ENCOUNTER — APPOINTMENT (OUTPATIENT)
Dept: ENDOCRINOLOGY | Facility: CLINIC | Age: 30
End: 2025-01-07
Payer: COMMERCIAL

## 2025-01-07 VITALS — WEIGHT: 150 LBS | HEIGHT: 66 IN | BODY MASS INDEX: 24.11 KG/M2

## 2025-01-07 DIAGNOSIS — E89.0 POSTABLATIVE HYPOTHYROIDISM: Primary | ICD-10-CM

## 2025-01-07 PROCEDURE — 1036F TOBACCO NON-USER: CPT | Performed by: STUDENT IN AN ORGANIZED HEALTH CARE EDUCATION/TRAINING PROGRAM

## 2025-01-07 PROCEDURE — 3008F BODY MASS INDEX DOCD: CPT | Performed by: STUDENT IN AN ORGANIZED HEALTH CARE EDUCATION/TRAINING PROGRAM

## 2025-01-07 PROCEDURE — G2211 COMPLEX E/M VISIT ADD ON: HCPCS | Performed by: STUDENT IN AN ORGANIZED HEALTH CARE EDUCATION/TRAINING PROGRAM

## 2025-01-07 PROCEDURE — 99214 OFFICE O/P EST MOD 30 MIN: CPT | Performed by: STUDENT IN AN ORGANIZED HEALTH CARE EDUCATION/TRAINING PROGRAM

## 2025-01-07 ASSESSMENT — PATIENT HEALTH QUESTIONNAIRE - PHQ9
1. LITTLE INTEREST OR PLEASURE IN DOING THINGS: NOT AT ALL
2. FEELING DOWN, DEPRESSED OR HOPELESS: NOT AT ALL
SUM OF ALL RESPONSES TO PHQ9 QUESTIONS 1 AND 2: 0

## 2025-01-07 ASSESSMENT — ENCOUNTER SYMPTOMS
OCCASIONAL FEELINGS OF UNSTEADINESS: 0
DEPRESSION: 0
LOSS OF SENSATION IN FEET: 0

## 2025-02-03 PROCEDURE — RXMED WILLOW AMBULATORY MEDICATION CHARGE

## 2025-02-05 ENCOUNTER — PHARMACY VISIT (OUTPATIENT)
Dept: PHARMACY | Facility: CLINIC | Age: 30
End: 2025-02-05
Payer: COMMERCIAL

## 2025-02-13 LAB — TSH SERPL-ACNC: 0.01 MIU/L (ref 0.4–4.5)

## 2025-02-14 DIAGNOSIS — E89.0 POSTABLATIVE HYPOTHYROIDISM: Primary | ICD-10-CM

## 2025-02-14 PROCEDURE — RXMED WILLOW AMBULATORY MEDICATION CHARGE

## 2025-02-14 RX ORDER — LEVOTHYROXINE SODIUM 112 UG/1
112 TABLET ORAL DAILY
Qty: 30 TABLET | Refills: 11 | Status: SHIPPED | OUTPATIENT
Start: 2025-02-14 | End: 2026-02-14

## 2025-02-15 ENCOUNTER — PHARMACY VISIT (OUTPATIENT)
Dept: PHARMACY | Facility: CLINIC | Age: 30
End: 2025-02-15
Payer: COMMERCIAL

## 2025-03-11 PROCEDURE — RXMED WILLOW AMBULATORY MEDICATION CHARGE

## 2025-03-19 ENCOUNTER — PHARMACY VISIT (OUTPATIENT)
Dept: PHARMACY | Facility: CLINIC | Age: 30
End: 2025-03-19
Payer: COMMERCIAL

## 2025-03-19 PROCEDURE — RXMED WILLOW AMBULATORY MEDICATION CHARGE

## 2025-04-10 DIAGNOSIS — E05.00 GRAVES DISEASE: Primary | ICD-10-CM

## 2025-04-10 DIAGNOSIS — E89.0 POSTABLATIVE HYPOTHYROIDISM: ICD-10-CM

## 2025-04-10 LAB — TSH SERPL-ACNC: 0.01 MIU/L (ref 0.4–4.5)

## 2025-04-10 PROCEDURE — RXMED WILLOW AMBULATORY MEDICATION CHARGE

## 2025-04-10 RX ORDER — LEVOTHYROXINE SODIUM 100 UG/1
100 TABLET ORAL DAILY
Qty: 30 TABLET | Refills: 11 | Status: SHIPPED | OUTPATIENT
Start: 2025-04-10 | End: 2026-04-10

## 2025-04-11 DIAGNOSIS — E89.0 POSTABLATIVE HYPOTHYROIDISM: ICD-10-CM

## 2025-04-16 ENCOUNTER — PHARMACY VISIT (OUTPATIENT)
Dept: PHARMACY | Facility: CLINIC | Age: 30
End: 2025-04-16
Payer: COMMERCIAL

## 2025-05-12 ENCOUNTER — PHARMACY VISIT (OUTPATIENT)
Dept: PHARMACY | Facility: CLINIC | Age: 30
End: 2025-05-12
Payer: COMMERCIAL

## 2025-05-12 PROCEDURE — RXMED WILLOW AMBULATORY MEDICATION CHARGE

## 2025-05-21 DIAGNOSIS — E89.0 POSTABLATIVE HYPOTHYROIDISM: Primary | ICD-10-CM

## 2025-05-29 DIAGNOSIS — E89.0 POSTABLATIVE HYPOTHYROIDISM: Primary | ICD-10-CM

## 2025-05-29 LAB
T4 FREE SERPL-MCNC: 2.1 NG/DL (ref 0.8–1.8)
TSH SERPL-ACNC: 0.01 MIU/L (ref 0.4–4.5)

## 2025-05-29 PROCEDURE — RXMED WILLOW AMBULATORY MEDICATION CHARGE

## 2025-05-29 RX ORDER — LEVOTHYROXINE SODIUM 75 UG/1
75 TABLET ORAL DAILY
Qty: 30 TABLET | Refills: 11 | Status: SHIPPED | OUTPATIENT
Start: 2025-05-29 | End: 2026-05-29

## 2025-05-30 ENCOUNTER — PHARMACY VISIT (OUTPATIENT)
Dept: PHARMACY | Facility: CLINIC | Age: 30
End: 2025-05-30
Payer: COMMERCIAL

## 2025-06-05 DIAGNOSIS — E89.0 POSTABLATIVE HYPOTHYROIDISM: ICD-10-CM

## 2025-06-24 PROCEDURE — RXMED WILLOW AMBULATORY MEDICATION CHARGE

## 2025-06-25 ENCOUNTER — PHARMACY VISIT (OUTPATIENT)
Dept: PHARMACY | Facility: CLINIC | Age: 30
End: 2025-06-25
Payer: COMMERCIAL

## 2025-07-10 DIAGNOSIS — E89.0 POSTABLATIVE HYPOTHYROIDISM: ICD-10-CM

## 2025-07-17 LAB
T4 FREE SERPL-MCNC: 1.9 NG/DL (ref 0.8–1.8)
TSH SERPL-ACNC: 0.01 MIU/L (ref 0.4–4.5)

## 2025-07-18 DIAGNOSIS — E89.0 POSTABLATIVE HYPOTHYROIDISM: Primary | ICD-10-CM

## 2025-07-29 ENCOUNTER — PHARMACY VISIT (OUTPATIENT)
Dept: PHARMACY | Facility: CLINIC | Age: 30
End: 2025-07-29
Payer: COMMERCIAL

## 2025-07-29 PROCEDURE — RXMED WILLOW AMBULATORY MEDICATION CHARGE

## 2025-09-04 ENCOUNTER — PHARMACY VISIT (OUTPATIENT)
Dept: PHARMACY | Facility: CLINIC | Age: 30
End: 2025-09-04
Payer: COMMERCIAL

## 2025-09-04 PROCEDURE — RXMED WILLOW AMBULATORY MEDICATION CHARGE

## 2025-09-05 LAB — TSH SERPL-ACNC: <0.01 MIU/L (ref 0.4–4.5)

## 2026-01-08 ENCOUNTER — APPOINTMENT (OUTPATIENT)
Dept: ENDOCRINOLOGY | Facility: CLINIC | Age: 31
End: 2026-01-08
Payer: COMMERCIAL

## 2026-02-03 ENCOUNTER — APPOINTMENT (OUTPATIENT)
Dept: ENDOCRINOLOGY | Facility: CLINIC | Age: 31
End: 2026-02-03
Payer: COMMERCIAL